# Patient Record
(demographics unavailable — no encounter records)

---

## 2025-02-05 NOTE — END OF VISIT
[FreeTextEntry3] : This note was written by Finesse Ly on 02/05/2025 acting solely as a scribe for Dr. Pipe Hauser.   All medical record entries made by the Scribe were at my, Dr. Pipe Hauser, direction and personally dictated by me on 02/05/2025. I have personally reviewed the chart and agree that the record accurately reflects my personal performance of the history, physical exam, assessment and plan.

## 2025-02-05 NOTE — PHYSICAL EXAM
[de-identified] : - Constitutional: This is a female in no obvious distress.   - Psych: Patient is alert and oriented to person, place and time.  Patient has a normal mood and affect. - Cardiovascular: Normal pulses throughout the upper extremities.  No significant varicosities are noted in the upper extremities.    ---   Examination of her left elbow demonstrates no obvious swelling.  She has residual tenderness along the lateral epicondyle.  She has residual apain to resisted wrist extension.  She also has some tenderness medially along the medial epicondyle with pain to resisted forearm pronation.  She has motion from 0 to 104 degrees of flexion and 90 degrees of pronation supination.  She remains neurovascularly intact distally. [de-identified] : An MRI report of her left elbow dated 06/13/202 demonstrated trace joint effusion. Increased long TR signal is seen within the radial collateral ligament consistent with a partial tear/tendinitis. \par  \par  I reviewed previously an MRI of her left shoulder from 06/22/2023. No official report was available, however based upon my review there is no obvious rotator cuff tear.

## 2025-02-05 NOTE — DISCUSSION/SUMMARY
[FreeTextEntry1] : I had a discussion regarding today's visit, the diagnosis and treatment recommendations and options.  We also discussed changes since the last visit.   At this time, she has reached maximal medical improvement, and is amenable to a scheduled loss of use.  Based upon the [facility] Worker's Compensation Board Medical Guidelines, I find a 10% scheduled loss of use of her left arm.  This is based upon her diagnosis and residual symptoms.  My cumulative time spent on today's visit was greater than 30 minutes and included: Preparation for the visit, review of the medical records, review of pertinent diagnostic studies, examination and counseling of the patient on the above diagnosis, treatment plan and prognosis, orders of diagnostic tests, medications and/or appropriate procedures and documentation in the medical records of today's visit.

## 2025-02-05 NOTE — ADDENDUM
[FreeTextEntry1] :  I, Finesse Ly, acted solely as a scribe for Dr. Hauser on this date on 02/05/2025.

## 2025-02-05 NOTE — HISTORY OF PRESENT ILLNESS
[Right] : right hand dominant [Has the patient missed work because of the injury/illness?] : The patient has missed work because of the injury/illness. [No] : The patient is currently not working. [FreeTextEntry1] : Workmen's Compensation case  Date of accident: 06/07/2023 Working: Yes Degree of disability:   10% from her occupation.  She works as an occupational therapist.  Follow-up regarding left elbow and shoulder after an injury on 6/7/2023.  She works as an occupational therapist.  See prior notes.  She has been treated with physical therapy.    She returns today for determination of a schedule loss of use.  She is overall doing well and still reports some pain, which she rates as a 3/10.   She is a friend of Angela Back, who is an NP at the office.  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  [FreeTextEntry6] : 06/08/2023

## 2025-05-20 NOTE — PROCEDURE
[de-identified] : Procedure: Transnasal flexible laryngoscopy Description: Informed consent was obtained from the patient prior to the procedure. The patient was seated in the clinic chair. Topical anesthesia was achieved by first spraying the nasal cavities with 4% lidocaine and 1% phenylephrine.   Exam: Clear vallecula, crisp epiglottis. Aryepiglottic folds: intact and symmetric bilaterally Hypopharynx: No pooling Interarytenoid space: No lesions or pachydermia False vocal folds: Symmetric and without lesions or masses. False fold voicing (plica ventricularis) is not noted True vocal folds: normal and symmetric motion bilaterally. No paradoxical motion. Right medial edge is crisp and shows no lesions or masses. Left medial edge is crisp and shows no lesions or masses. Mucosal covering is minimally edematous. No erythema. No obvious vascular ectasias. Vocal processes do not demonstrate granulomas. Subglottis/proximal trachea: clear and unobstructed to the limits of the examination today.

## 2025-05-20 NOTE — HISTORY OF PRESENT ILLNESS
[de-identified] : LALO PABON  is a 44 year old woman who presents to the [facility] with a chief complaint of globus sensation. She is referred by Dr. Lisa Fischer. They have had globus sensation in their throat for 2 months ago. Feels she is choking on her saliva. They do not have a prior CT neck. Does have CT spine from ~1 year ago. They do not have prior smoking history. They do not have prior history of alcoholism/alcohol abuse. They do not have frequent ear pain. They do not have a prior swallow study in the past 1 year. They have seen the following types of providers for this problem: PCP They have taken the following medications for this problem: none Doing or taking the following makes the globus sensation better: nothing Doing the following makes the globus sensation worse: talking, turning her head Had recent thyroid US 3 weeks ago, normal as per pt. Feels her voice is strained when singing. Normal speaking voice. Does have pain in the back of her neck due to herniated discs. Denies dyspnea, fevers/chills, throat pain. States only occurs when turning head or singing Denies throat pain

## 2025-05-20 NOTE — CONSULT LETTER
[Dear  ___] : Dear  [unfilled], [Consult Letter:] : I had the pleasure of evaluating your patient, [unfilled]. [Please see my note below.] : Please see my note below. [Consult Closing:] : Thank you very much for allowing me to participate in the care of this patient.  If you have any questions, please do not hesitate to contact me. [Sincerely,] : Sincerely, [FreeTextEntry2] : Dr. Lisa Fischer [FreeTextEntry3] : [facility] Corona M.D. Division of Laryngology | Department of Otolaryngology  [address] [address]

## 2025-05-20 NOTE — PROCEDURE
[de-identified] : Procedure: Transnasal flexible laryngoscopy Description: Informed consent was obtained from the patient prior to the procedure. The patient was seated in the clinic chair. Topical anesthesia was achieved by first spraying the nasal cavities with 4% lidocaine and 1% phenylephrine.   Exam: Clear vallecula, crisp epiglottis. Aryepiglottic folds: intact and symmetric bilaterally Hypopharynx: No pooling Interarytenoid space: No lesions or pachydermia False vocal folds: Symmetric and without lesions or masses. False fold voicing (plica ventricularis) is not noted True vocal folds: normal and symmetric motion bilaterally. No paradoxical motion. Right medial edge is crisp and shows no lesions or masses. Left medial edge is crisp and shows no lesions or masses. Mucosal covering is minimally edematous. No erythema. No obvious vascular ectasias. Vocal processes do not demonstrate granulomas. Subglottis/proximal trachea: clear and unobstructed to the limits of the examination today.

## 2025-05-20 NOTE — HISTORY OF PRESENT ILLNESS
[de-identified] : LALO PABON  is a 44 year old woman who presents to the [facility] with a chief complaint of globus sensation. She is referred by Dr. Lisa Fischer. They have had globus sensation in their throat for 2 months ago. Feels she is choking on her saliva. They do not have a prior CT neck. Does have CT spine from ~1 year ago. They do not have prior smoking history. They do not have prior history of alcoholism/alcohol abuse. They do not have frequent ear pain. They do not have a prior swallow study in the past 1 year. They have seen the following types of providers for this problem: PCP They have taken the following medications for this problem: none Doing or taking the following makes the globus sensation better: nothing Doing the following makes the globus sensation worse: talking, turning her head Had recent thyroid US 3 weeks ago, normal as per pt. Feels her voice is strained when singing. Normal speaking voice. Does have pain in the back of her neck due to herniated discs. Denies dyspnea, fevers/chills, throat pain. States only occurs when turning head or singing Denies throat pain

## 2025-05-20 NOTE — ASSESSMENT
[FreeTextEntry1] : Assessment/Plan:  #1 Left neck globus #2 Possible LPR  This sensation may be secondary to LPR and as such I have recommended we start famotidine 40mg daily before breakfast and omeprazole 40mg before dinner.  The risks, benefits, and alternatives to care were discussed with the patient and understanding expressed.    As her story is not classic for globus and may have small palpable mass left neck I have also ordered neck US.   I would like to see back in 1 month.

## 2025-05-20 NOTE — CONSULT LETTER
[Dear  ___] : Dear  [unfilled], [Consult Letter:] : I had the pleasure of evaluating your patient, [unfilled]. [Please see my note below.] : Please see my note below. [Consult Closing:] : Thank you very much for allowing me to participate in the care of this patient.  If you have any questions, please do not hesitate to contact me. [Sincerely,] : Sincerely, [FreeTextEntry2] : Dr. Lisa Fischer [FreeTextEntry3] : Cortez Corona M.D. Division of Laryngology | Department of Otolaryngology  [address] [address]

## 2025-06-09 NOTE — REASON FOR VISIT
[Follow-Up Visit] : a follow-up visit for [Workers' Comp: Date of Injury: _______] : This visit is related to worker's compensation. Date of Injury: [unfilled] [Neck Pain] : neck pain [Herniated Discs] : herniated discs [Radiculopathy] : radiculopathy

## 2025-06-09 NOTE — DISCUSSION/SUMMARY
[Medication Risks Reviewed] : Medication risks reviewed [de-identified] : Assessment: - Summary : Patient presents with chronic neck pain and upper extremity symptoms related to work injury from June 2023. MRI previously showed cervical disc herniation. Patient has ongoing symptoms despite returning to full-duty work and continuing physical therapy. There is evidence of permanent partial disability. - Problems : - Chronic cervical radiculopathy  - Work-related cervical spine and upper extremity injuries  - Cervical disc herniation  - Bilateral shoulder tendonitis  - Differential Diagnosis : - Cervical spondylosis  - Cervical disc herniation  - Cervical radiculopathy  - Rotator cuff tendinopathy   Plan: - Summary : Patient requires ongoing treatment for chronic neck and upper extremity symptoms. Current management includes physical therapy and pain medication. Considering the persistent symptoms and time since injury, further interventions may be necessary. - Plan : - Continue current physical therapy regimen  - Maintain current pain management with gabapentin and ibuprofen as needed  - Consider updated MRI of cervical spine to assess progression of disc herniation if she would like to proceed with further interventions  - Discuss potential for pain management referral for epidural steroid injections  - Provide report on permanent partial disability for work compensation  - Follow up as needed or if symptoms worsen  - Consider surgical consultation if symptoms become severe or unmanageable with conservative treatment

## 2025-06-09 NOTE — HISTORY OF PRESENT ILLNESS
[Has the patient missed work because of the injury/illness?] : The patient has missed work because of the injury/illness. [Yes] : The patient is currently working. [Resumed limited work of any kind: ______] : The patient resumed limited work on [unfilled]. [FreeTextEntry1] : DOI 6/7/23 - Summary : Patient is  presenting for follow-up of work-related neck and upper extremity injuries from June 7, 2023. She reports ongoing neck pain, radiation, and bulging discs. Patient has returned to full-duty work since September 2023 but continues to experience flare-ups with heavy lifting or cooking. - Chief Complaint (CC) : Follow-up for work-related neck and upper extremity injuries - History of Present Illness (HPI) : Patient is a Persian female who sustained work-related injuries on June 7, 2023, while lifting a hemipatient. Initially, she felt pain in her elbow, followed by severe swelling and neck pain the next day. MRI of the cervical spine and left elbow showed herniation. Patient reports ongoing neck pain with radiation, bulging discs, and shoulder issues. She returned to full-duty work in September 2023 but continues to experience flare-ups with heavy lifting or cooking. Patient reports severe spasms in the upper trapezius bilaterally and occasional tingling and numbness in both arms, more pronounced on the right side. She is currently undergoing physical therapy and taking gabapentin and ibuprofen for pain management. - Past Medical History : - Work-related neck and upper extremity injuries (June 7, 2023)  - Past Surgical History : - Family History : - Social History : - Occupation: Occupational Therapist (OT)  - Employment: Returned to full-duty work in September 2023  - Other History : - Review of Systems : - Musculoskeletal: Positive for neck pain, radiation, upper trapezius spasms, shoulder pain, and occasional arm tingling and numbness  - Neurological: Positive for occasional tingling and numbness in arms, more on the right side  - Medications : - Gabapentin (Active)  - Ibuprofen (Active)  - Allergies : - No Known Drug Allergies [FreeTextEntry2] : OT [FreeTextEntry6] : 6/8/23 [FreeTextEntry7] : full duty 9/2023

## 2025-06-09 NOTE — ASSESSMENT
[Indicate if, in your opinion, the incident that the patient described was the competent medical cause of this injury/illness.] : The incident that the patient described was the competent medical cause of this injury/illness: Yes [Indicate if the patient's complaints are consistent with his/her history of the injury/illness.] : Indicate if the patient's complaints are consistent with his/her history of the injury/illness: Yes [Yes] : Yes, it is consistent [FreeTextEntry5] : 25 [Physical Disability Permanent Partial] : permanently partial disabled

## 2025-06-09 NOTE — PHYSICAL EXAM
[Normal] : Gait: normal [UE] : Sensory: Intact in bilateral upper extremities [1+] : left triceps 1+ [0] : left brachioradialis 0 [Rad] : radial 2+ and symmetric bilaterally [Vernon's Sign] : negative Vernon's sign [Plantar Reflex Right Only] : absent on the right [Plantar Reflex Left Only] : absent on the left [DTR Reflexes Clonus Of Right Ankle (___ Beats)] : absent on the right [DTR Reflexes Clonus Of Left Ankle (___ Beats)] : absent on the left [de-identified] : Paraspinal cervical tenderness and trapezial tenderness left more than right.  Supraspinatus tenderness left more than right. mildly positive Neers sign on the right, negative Thompson on the right. Negative Neer and Thompson sign left. [de-identified] : left lat rot 40 degrees with left sided neck pulling, flex 45 degrees, ext 40 degrees, right lat rot 40 degrees. discomfort with end ROM Tenderness over the bicipital groove bilaterally right shoulder painful with IR to L3, left shoulder less pain with IR to L3 [de-identified] : 4 views cervical spine demonstrate no significant scoliosis.  Minimal straightening of cervical lordosis in the upright chest.  Loss of disc height noted at C5-6 and C6-7.  No acute fractures no dynamic instability between flexion-extension. Slight progression of degeneration at C6-7